# Patient Record
Sex: MALE | Race: WHITE
[De-identification: names, ages, dates, MRNs, and addresses within clinical notes are randomized per-mention and may not be internally consistent; named-entity substitution may affect disease eponyms.]

---

## 2023-07-26 ENCOUNTER — HOSPITAL ENCOUNTER (OUTPATIENT)
Dept: HOSPITAL 47 - LABPAT | Age: 63
Discharge: HOME | End: 2023-07-26
Attending: UROLOGY
Payer: MEDICARE

## 2023-07-26 DIAGNOSIS — Z01.812: Primary | ICD-10-CM

## 2023-07-26 DIAGNOSIS — C61: ICD-10-CM

## 2023-07-26 LAB
ANION GAP SERPL CALC-SCNC: 10.1 MMOL/L (ref 4–12)
BASOPHILS # BLD AUTO: 0.05 X 10*3/UL (ref 0–0.1)
BASOPHILS NFR BLD AUTO: 0.7 %
BUN SERPL-SCNC: 18.7 MG/DL (ref 9–27)
BUN/CREAT SERPL: 20.78 RATIO (ref 12–20)
CALCIUM SPEC-MCNC: 9.7 MG/DL (ref 8.7–10.3)
CHLORIDE SERPL-SCNC: 98 MMOL/L (ref 96–109)
CO2 SERPL-SCNC: 29.9 MMOL/L (ref 21.6–31.8)
EOSINOPHIL # BLD AUTO: 0.36 X 10*3/UL (ref 0.04–0.35)
EOSINOPHIL NFR BLD AUTO: 5.4 %
ERYTHROCYTE [DISTWIDTH] IN BLOOD BY AUTOMATED COUNT: 4.81 X 10*6/UL (ref 4.4–5.6)
ERYTHROCYTE [DISTWIDTH] IN BLOOD: 15.5 % (ref 11.5–14.5)
GLUCOSE SERPL-MCNC: 160 MG/DL (ref 70–110)
HCT VFR BLD AUTO: 43.1 % (ref 39.6–50)
HGB BLD-MCNC: 13.5 D/DL (ref 13–17)
IMM GRANULOCYTES BLD QL AUTO: 0.3 %
LYMPHOCYTES # SPEC AUTO: 1.26 X 10*3/UL (ref 0.9–5)
LYMPHOCYTES NFR SPEC AUTO: 18.9 %
MCH RBC QN AUTO: 28.1 PG (ref 27–32)
MCHC RBC AUTO-ENTMCNC: 31.3 D/DL (ref 32–37)
MCV RBC AUTO: 89.6 FL (ref 80–97)
MONOCYTES # BLD AUTO: 0.87 X 10*3/UL (ref 0.2–1)
MONOCYTES NFR BLD AUTO: 13 %
NEUTROPHILS # BLD AUTO: 4.12 X 10*3/UL (ref 1.8–7.7)
NEUTROPHILS NFR BLD AUTO: 61.7 %
NRBC BLD AUTO-RTO: 0 X 10*3/UL (ref 0–0.01)
PLATELET # BLD AUTO: 216 X 10*3/UL (ref 140–440)
POTASSIUM SERPL-SCNC: 4.9 MMOL/L (ref 3.5–5.5)
SODIUM SERPL-SCNC: 138 MMOL/L (ref 135–145)
WBC # BLD AUTO: 6.68 X 10*3/UL (ref 4.5–10)

## 2023-07-26 PROCEDURE — 85025 COMPLETE CBC W/AUTO DIFF WBC: CPT

## 2023-07-26 PROCEDURE — 80048 BASIC METABOLIC PNL TOTAL CA: CPT

## 2023-08-03 ENCOUNTER — HOSPITAL ENCOUNTER (OUTPATIENT)
Dept: HOSPITAL 47 - OR | Age: 63
Setting detail: OBSERVATION
LOS: 2 days | Discharge: HOME | End: 2023-08-05
Attending: UROLOGY | Admitting: UROLOGY
Payer: MEDICARE

## 2023-08-03 DIAGNOSIS — Z98.890: ICD-10-CM

## 2023-08-03 DIAGNOSIS — I10: ICD-10-CM

## 2023-08-03 DIAGNOSIS — Z79.01: ICD-10-CM

## 2023-08-03 DIAGNOSIS — Z87.19: ICD-10-CM

## 2023-08-03 DIAGNOSIS — J44.9: ICD-10-CM

## 2023-08-03 DIAGNOSIS — Z79.84: ICD-10-CM

## 2023-08-03 DIAGNOSIS — K21.9: ICD-10-CM

## 2023-08-03 DIAGNOSIS — Z82.5: ICD-10-CM

## 2023-08-03 DIAGNOSIS — I48.0: ICD-10-CM

## 2023-08-03 DIAGNOSIS — I25.10: ICD-10-CM

## 2023-08-03 DIAGNOSIS — Z79.51: ICD-10-CM

## 2023-08-03 DIAGNOSIS — Z88.5: ICD-10-CM

## 2023-08-03 DIAGNOSIS — Z90.49: ICD-10-CM

## 2023-08-03 DIAGNOSIS — M19.90: ICD-10-CM

## 2023-08-03 DIAGNOSIS — Z79.899: ICD-10-CM

## 2023-08-03 DIAGNOSIS — F17.290: ICD-10-CM

## 2023-08-03 DIAGNOSIS — Z95.5: ICD-10-CM

## 2023-08-03 DIAGNOSIS — Z79.4: ICD-10-CM

## 2023-08-03 DIAGNOSIS — Z81.8: ICD-10-CM

## 2023-08-03 DIAGNOSIS — Z95.1: ICD-10-CM

## 2023-08-03 DIAGNOSIS — C61: Primary | ICD-10-CM

## 2023-08-03 DIAGNOSIS — F32.A: ICD-10-CM

## 2023-08-03 DIAGNOSIS — Z85.828: ICD-10-CM

## 2023-08-03 DIAGNOSIS — F41.9: ICD-10-CM

## 2023-08-03 DIAGNOSIS — G47.33: ICD-10-CM

## 2023-08-03 DIAGNOSIS — E11.9: ICD-10-CM

## 2023-08-03 DIAGNOSIS — E78.5: ICD-10-CM

## 2023-08-03 DIAGNOSIS — D62: ICD-10-CM

## 2023-08-03 LAB
GLUCOSE BLD-MCNC: 137 MG/DL (ref 70–110)
GLUCOSE BLD-MCNC: 228 MG/DL (ref 70–110)
GLUCOSE BLD-MCNC: 293 MG/DL (ref 70–110)

## 2023-08-03 PROCEDURE — 64999 UNLISTED PX NERVOUS SYSTEM: CPT

## 2023-08-03 PROCEDURE — 94640 AIRWAY INHALATION TREATMENT: CPT

## 2023-08-03 PROCEDURE — 86900 BLOOD TYPING SEROLOGIC ABO: CPT

## 2023-08-03 PROCEDURE — 80048 BASIC METABOLIC PNL TOTAL CA: CPT

## 2023-08-03 PROCEDURE — 88307 TISSUE EXAM BY PATHOLOGIST: CPT

## 2023-08-03 PROCEDURE — 85025 COMPLETE CBC W/AUTO DIFF WBC: CPT

## 2023-08-03 PROCEDURE — 86850 RBC ANTIBODY SCREEN: CPT

## 2023-08-03 PROCEDURE — 55866 LAPS SURG PRST8ECT RPBIC RAD: CPT

## 2023-08-03 PROCEDURE — 86901 BLOOD TYPING SEROLOGIC RH(D): CPT

## 2023-08-03 PROCEDURE — 88309 TISSUE EXAM BY PATHOLOGIST: CPT

## 2023-08-03 PROCEDURE — 38571 LAPAROSCOPY LYMPHADENECTOMY: CPT

## 2023-08-03 PROCEDURE — 88344 IMHCHEM/IMCYTCHM EA MLT ANTB: CPT

## 2023-08-03 RX ADMIN — POTASSIUM CHLORIDE SCH: 14.9 INJECTION, SOLUTION INTRAVENOUS at 18:19

## 2023-08-03 RX ADMIN — HYDROMORPHONE HYDROCHLORIDE PRN MG: 1 INJECTION, SOLUTION INTRAMUSCULAR; INTRAVENOUS; SUBCUTANEOUS at 23:59

## 2023-08-03 RX ADMIN — INSULIN DETEMIR SCH UNIT: 100 INJECTION, SOLUTION SUBCUTANEOUS at 21:28

## 2023-08-03 RX ADMIN — ATORVASTATIN CALCIUM SCH MG: 80 TABLET, FILM COATED ORAL at 21:28

## 2023-08-03 RX ADMIN — BUDESONIDE AND FORMOTEROL FUMARATE DIHYDRATE SCH PUFF: 80; 4.5 AEROSOL RESPIRATORY (INHALATION) at 21:41

## 2023-08-03 RX ADMIN — INSULIN ASPART SCH UNIT: 100 INJECTION, SOLUTION INTRAVENOUS; SUBCUTANEOUS at 18:17

## 2023-08-03 RX ADMIN — HYDROMORPHONE HYDROCHLORIDE PRN MG: 1 INJECTION, SOLUTION INTRAMUSCULAR; INTRAVENOUS; SUBCUTANEOUS at 19:40

## 2023-08-03 RX ADMIN — POTASSIUM CHLORIDE ONE MLS: 14.9 INJECTION, SOLUTION INTRAVENOUS at 06:20

## 2023-08-03 RX ADMIN — POTASSIUM CHLORIDE SCH MLS/HR: 14.9 INJECTION, SOLUTION INTRAVENOUS at 14:43

## 2023-08-03 RX ADMIN — CLOBETASOL PROPIONATE SCH: 0.5 OINTMENT TOPICAL at 21:29

## 2023-08-03 RX ADMIN — HYDROMORPHONE HYDROCHLORIDE PRN MG: 1 INJECTION, SOLUTION INTRAMUSCULAR; INTRAVENOUS; SUBCUTANEOUS at 17:15

## 2023-08-03 RX ADMIN — POTASSIUM CHLORIDE SCH MLS: 14.9 INJECTION, SOLUTION INTRAVENOUS at 06:14

## 2023-08-03 RX ADMIN — POTASSIUM CHLORIDE SCH MLS/HR: 14.9 INJECTION, SOLUTION INTRAVENOUS at 23:58

## 2023-08-03 RX ADMIN — HYDROMORPHONE HYDROCHLORIDE PRN MG: 1 INJECTION, SOLUTION INTRAMUSCULAR; INTRAVENOUS; SUBCUTANEOUS at 14:22

## 2023-08-03 RX ADMIN — POTASSIUM CHLORIDE ONE: 14.9 INJECTION, SOLUTION INTRAVENOUS at 14:39

## 2023-08-03 NOTE — P.GSHP
History of Present Illness


H&P Date: 08/02/23


Chief Complaint: Prostate cancer


The patient is a 62-year-old white male found to have an elevated PSA level of 

14.6 in May 2023.  TERRI revealed the prostate to be firm on the left side, an 

ultrasound revealed a hypoechoic lesion at the right base.  5 of 6 prostate 

biopsies showed [place] 7 (3+4) adenocarcinoma.  Alternative treatment options 

were reviewed, the patient has elected to undergo a robotic-assisted 

laparoscopic prostatectomy with bilateral pelvic lymphadenectomy.  He has a 

history coronary artery disease and was cleared by cardiology.  He has 

previously undergone a colon resection for diverticulitis.








- Cardiovascular


Cardiovascular: Reports high blood pressure





- Genitourinary (Male)


Genitourinary: Reports urinary hesitancy





Medications and Allergies


                                    Allergies











Allergy/AdvReac Type Severity Reaction Status Date / Time


 


morphine Allergy  swelling,it Verified 08/03/23 06:13





   chiness  














Surgical - Exam





- General


well developed, well nourished, no distress





- Respiratory


normal respiratory effort





- Abdomen


Abdomen: soft, non tender, no guarding, no rigid, no rebound





- Genitourinary


normal penis with no external lesions, testicles non-tender





- Rectum


Rectum: normal sphincter tone, no masses, other (prostate firm on left)





- Psychiatric


oriented to time, oriented to person, oriented to place, speech is normal, 

memory intact





Assessment and Plan


(1) Malignant neoplasm of prostate


Current Visit: No   Status: Acute   Code(s): C61 - MALIGNANT NEOPLASM OF 

PROSTATE   SNOMED Code(s): 102034950


   


Plan: 


The patient has been made aware of the fact that his prostate cancer may require

multimodal treatment.  He has elected to undergo a robotic-assisted laparoscopic

prostatectomy with bilateral pelvic lymphadenectomy.  Due to prior abdominal 

surgery, this will be attempted but the patient is aware of the possibility that

it cannot be performed.  Potential surgical risks have been reviewed in detail. 

These include anesthesia, bleeding, infection, bowel injury, urinary leak, 

lymphocele, and vesical neck contracture.  The possibility of post-prostatectomy

urinary incontinence has been made clear, and the patient is aware of the high 

likelihood of erectile dysfunction as he is not a candidate for a nerve sparing 

procedure.  He is also aware of the possible need for adjuvant therapy.

## 2023-08-03 NOTE — P.OP
Date of Procedure: 08/03/23


Preoperative Diagnosis: 


Adenocarcinoma of the prostate


Postoperative Diagnosis: 


Same


Procedure(s) Performed: 


Robotic-assisted laparoscopic prostatectomy (RALP) with bilateral pelvic 

lymphadenectomy


Anesthesia: KRYS


Surgeon: Thomas Calhoun


Assistant #1: Bruno Teran


Estimated Blood Loss (ml): 75


IV fluids (ml): 600


Condition: stable


Disposition: PACU


Indications for Procedure: 


The patient is a 62-year-old white male found to have an elevated PSA level of 

14.6 in May 2023.  TERRI revealed the prostate to be firm on the left side, an 

ultrasound revealed a hypoechoic lesion at the right base.  5 of 6 prostate 

biopsies showed [place] 7 (3+4) adenocarcinoma.  Alternative treatment options 

were reviewed, the patient has elected to undergo a robotic-assisted 

laparoscopic prostatectomy with bilateral pelvic lymphadenectomy.  He has a 

history coronary artery disease and was cleared by cardiology.  He has 

previously undergone a colon resection for diverticulitis.


Operative Findings: 


No evidence of extraprostatic disease.


Description of Procedure: 


The patient was taken in the operating room and placed in the supine position.  

He was carefully positioned on a beanbag for stability.  The abdomen and 

external genitalia were prepped and draped sterilely.  A Jones catheter was 

inserted.  The Bovie electrocautery was used to make a midline supraumbilical 

skin incision.  The linea alba was carefully incised in the midline, and the 

peritoneum was entered.  Omentum was adherent to the anterior abdominal wall 

inferior to this, and these adhesions were gently swept away.  A gel point mini 

was then placed, and insufflation was performed to a pressure of 20 mm Hg.  Once

insufflation was performed, under camera guidance, 3 8 mm robotic ports were 

placed, 2 on the left and one on the right.  A 12 mm port was placed on the 

right lateral side for use as an assistant port.  A 5 mm port was placed to the 

right of the camera port for suction.  The patient was placed in Trendelenburg 

position, and docking was then performed to the da Octavio system utilizing a 4-

arm approach.





The abdomen was examined.  The sigmoid colon was mobilized out of the pelvis.  

Adhesions were noted deep in the pelvis, some of which were lysed enough to gain

access to the necessary surgical planes.  However, it was evident that the small

bowel would not fall out of the pelvis as desired.  The peritoneum was incised 

lateral to the medial umbilical ligaments bilaterally, exposing the pubis.  The 

peritoneum was then incised across the midline, allowing the bladder flap to be 

taken down.  The endopelvic fascia was opened bilaterally, and muscular 

attachments from the urogenital diaphragm were swept away from the prostate.





Bilateral pelvic lymphadenectomies were performed in the standard fashion.  The 

peritoneal incisions were extended in a cephalad direction, and the vas deferens

were divided bilaterally.  Margins of dissection were the bifurcation of the 

iliac vessels proximally, the circumflex iliac vein distally, the external iliac

artery laterally, and the obturator nerve medially.  A combination of sharp and 

blunt dissection was used.  Care was taken to avoid any neurovascular injury, 

and the use of monopolar electrocautery was avoided immediately adjacent to 

neurovascular structures.  Although pathology warranted an extended template, 

this was not performed given the difficulty with exposure.  No enlarged lymph 

nodes were encountered.  There were no complications.





The vesical neck was incised transversely, down to the lumen.  The Jones 

catheter was brought out through the anterior vesical neck incision and was used

for traction.  The posterior aspect of the vesical neck was incised, such that 

the full-thickness of the vesical neck was divided.  The anterior layer of the 

Denonvilliers fascia was incised, exposing the vas deferens.  Each were isolated

and divided.  Next, each of the seminal vesicles were dissected away from 

adjacent tissues, and vascular attachments were cauterized and divided.  The 

posterior leaf of Denonvilliers fascia was incised transversely, allowing entry 

into the plane between the prostate and rectum.  With lateral spreading, this 

plane was developed down to the apex.  This exposed the lateral vascular 

pedicles bilaterally.  The da Octavio vessel sealer was used to ligate and divide 

the vascular pedicles in an antegrade fashion, down to the apex.





The remaining apical attachments were swept away from the prostate.  The dorsal 

venous complex was incised, as well as periurethral tissue.  At this point, only

the urethra remained intact.  This was transected immediately distal to the 

prostatic apex using cold scissors.  The specimen was placed within a specimen 

bag.





The dorsal venous complex was sutured using a V-Loc suture in a running fashion.

 The vesicourethral anastomosis was then performed using a V-Loc suture in a 

running fashion.  After completing the anastomosis, an 18-Nepali Jones catheter 

was placed.  Due to meatal stenosis, it was necessary to use Gilchrist sounds to

dilate the urethral meatus.  Approximately 150 mL of 0.9 normal saline were 

instilled into the bladder.  No extravasation of irrigant from the 

vesicourethral anastomosis was noted.  A small amount of oozing was noted from 

the left lateral vascular pedicle, so Surgicel was placed over the vascular 

pedicles bilaterally.  A Chung-Jimenez drain was left within the pelvis, and was

brought out through the left lateral port.  Tisseel was sprayed into the pelvis 

over the vascular pedicles, dorsal vein, and vesicourethral anastomosis.





The patient was returned to the supine position.  Undocking was performed, and 

the specimen bag sutures were passed through the camera port.  After removing 

all the ports and allowing all of the CO2 to be released from the peritoneal 

cavity, the surgical specimen was removed along with the gel point.  The fascia 

of this incision was then closed using 0 PDS suture in a running fashion.  Each 

of the skin incisions were then closed using 4-0 Monocryl suture in a 

subcuticular fashion.  Marcaine was injected at each of the incision sites.  

Dermabond was applied to each incision.  The Jones catheter was connected to 

gravity drainage.  All sponge and needle counts were correct.  The patient 

tolerated the procedure well was taken to the recovery room in stable condition.

## 2023-08-03 NOTE — P.CONS
History of Present Illness





- Reason for Consult


Consult date: 08/03/23


Medical management


Requesting physician: Thomas Calhoun





- Chief Complaint


Prostate surgery





- History of Present Illness





This is a pleasant 62 patient follows with Dr. Frandy Vargas.


The patient is a 62-year-old white male found to have an elevated PSA level of 

14.6 in May 2023.  TERRI revealed the prostate to be firm on the left side, an 

ultrasound revealed a hypoechoic lesion at the right base.  5 of 6 prostate 

biopsies showed [place] 7 (3+4) adenocarcinoma.  Alternative treatment options 

were reviewed, the patient has elected to undergo a robotic-assisted 

laparoscopic prostatectomy with bilateral pelvic lymphadenectomy.  Patient today

underwent robotic-assisted laparoscopic prostatectomy and bilateral pelvic 

lymphadenectomy.  Has a Jones catheter in place.  Postprocedure has pain at the 

operative site.  No nausea vomiting.





Review of systems:


GEN.:  Tired


EYES: None


HEENT: None


NECK: None


RESPIRATORY: None


CARDIOVASCULAR: None


GASTROINTESTINAL: None


GENITOURINARY: Jones catheter


MUSCULOSKELETAL: None


LYMPHATICS: None


HEMATOLOGICAL: None  


PSYCHIATRY: None


NEUROLOGICAL: None





Past medical history to include:


Atrial fibrillation, COPD, diabetes, hypertension, osteoarthritis, obstructive 

sleep apnea uses CPAP, skin cancer, prostate cancer, CAD





Social history:


.  Patient smoked for about 30 years.  Stopped some time ago.  Alcohol 

occasionally.  Lawn cutter.





Physical examination:


VITAL SIGNS: 98.3, 78, 17, 121/68, 93% on 2 L


GENERAL: BMI 33.9, reclining in bed awake slightly uncomfortable.


EYES: Pupils equal.  Conjunctiva normal.


HEENT: External appearance of nose and ears normal, oral cavity grossly normal.


NECK: JVD not raised; masses not palpable.


HEART: First and second heart sounds are normal;  no edema.  


LUNGS: Respiratory rate normal; decreased breath sound.  


ABDOMEN: Soft,  nontender, liver spleen not palpable, no masses palpable Jones 

catheter.  


PSYCH: Alert and oriented x3;  mood  and affect likely anxiousl.  


MUSCULOSKELETAL:No Clubbing/cyanosis;muscles-grossly intact


NEUROLOGICAL: Cranial nerves grossly intact; no facial asymmetry,   power and 

sensation grossly intact. 


LYMPHATICS: No lymph nodes palpable in the axilla and neck





INVESTIGATIONS, reviewed in the clinical context:


07/26/2023:


White count 6.6 hemoglobin 13.5 platelets 216 sodium 138 potassium 4.9 

creatinine 0.9








Assessment and plan:





-Prostate adenocarcinoma with elevated PSA of 14.6 in May 2023.  506 prostate 

biopsy showed ileus and 7 adenocarcinoma.


robotic-assisted laparoscopic prostatectomy with bilateral pelvic 

lymphadenectomy.-By Dr. calhoun today.  Jones catheter in place.





-Paroxysmal atrial fibrillation


Currently eliquis on hold.  Toprol-XL 50 mg day.





-COPD in an ex-smoker


Symbicort





-Depression otherwise specified


Cymbalta





-Diabetes mellitus type 2, chronically on insulin


Resume Lantus.  Follow Accu-Cheks with sliding scale.  Hold Glucophage for now.





-GERD


PPI





-Hyperlipidemia


Crestor





-CAD





Care was discussed with the patient.  Follow Accu-Cheks.  Questions answered.  

Place on telemetry.





Thank you Dr. calhoun





Past Medical History


Past Medical History: Atrial Fibrillation, Asthma, Cancer, COPD, Diabetes 

Mellitus, Hypertension, Myocardial Infarction (MI), Osteoarthritis (OA), Sleep 

Apnea/CPAP/BIPAP


Additional Past Medical History / Comment(s): prostate cancer, skin cancer, uses

cpap


Last Myocardial Infarction Date:: 2005


History of Any Multi-Drug Resistant Organisms: None Reported


Past Surgical History: Back Surgery, Hernia Repair


Additional Past Surgical History / Comment(s): Quad bypass, hip surgerys left 

hip, anurysm repair, colon resection


Smoking Status: Former smoker





- Past Family History


  ** Father


Family Medical History: Asthma





  ** Mother


Additional Family Medical History / Comment(s): depression





Medications and Allergies


                                Home Medications











 Medication  Instructions  Recorded  Confirmed  Type


 


Apixaban [Eliquis] 5 mg PO BID 08/03/23 08/03/23 History


 


Budesonide/Formoterol Fumarate 2 puff INHALATION BID 08/03/23 08/03/23 History





[Symbicort 80-4.5 Mcg Inhaler]    


 


Clobetasol Propionate [Temovate 1 applic TOPICAL HS 08/03/23 08/03/23 History





0.05% Oint]    


 


DULoxetine HCL [Cymbalta] 60 mg PO DAILY 08/03/23 08/03/23 History


 


Furosemide [Lasix] 40 mg PO DAILY 08/03/23 08/03/23 History


 


Insulin Glargine [Lantus Vial] 58 unit SQ HS 08/03/23 08/03/23 History


 


Metoprolol Succinate (ER) [Toprol 50 mg PO DAILY 08/03/23 08/03/23 History





Xl]    


 


Multivitamin [Multivitamins Adult 1 each PO DAILY 08/03/23 08/03/23 History





Gummies]    


 


Omeprazole 20 mg PO DAILY 08/03/23 08/03/23 History


 


Rosuvastatin Calcium 40 mg PO HS 08/03/23 08/03/23 History


 


Spironolactone [Aldactone] 25 mg PO DAILY 08/03/23 08/03/23 History


 


Turmeric Root Extract [Turmeric] 1 tab PO HS 08/03/23 08/03/23 History


 


busPIRone HCL 15 mg PO BID 08/03/23 08/03/23 History


 


lisinopriL [Zestril] 10 mg PO DAILY 08/03/23 08/03/23 History


 


metFORMIN HCL [Glucophage] 850 mg PO BID 08/03/23 08/03/23 History








                                    Allergies











Allergy/AdvReac Type Severity Reaction Status Date / Time


 


morphine Allergy  swelling,it Verified 08/03/23 06:13





   chiness  














Physical Exam


Vitals: 


                                   Vital Signs











  Temp Pulse Pulse Resp BP Pulse Ox


 


 08/03/23 15:16  98.3 F  78   17  121/68  93 L


 


 08/03/23 13:46   78   16  125/59  93 L


 


 08/03/23 13:30   78   16  118/68  93 L


 


 08/03/23 13:18   72   16  112/64  93 L


 


 08/03/23 13:00   72   16  110/56  93 L


 


 08/03/23 12:45   76   16  117/59  93 L


 


 08/03/23 12:30   78   16  110/57  93 L


 


 08/03/23 12:15   69   16  115/56  95


 


 08/03/23 12:00   70   16  116/56  96


 


 08/03/23 11:46   76   16  132/64  96


 


 08/03/23 11:32   85   16  133/60  96


 


 08/03/23 11:14  97 F L  99   18  160/72  93 L


 


 08/03/23 07:20    70  18   98


 


 08/03/23 06:12  98.5 F   80  18  148/69  95








                                Intake and Output











 08/03/23 08/03/23 08/03/23





 06:59 14:59 22:59


 


Intake Total 200 700 


 


Output Total  1175 


 


Balance 200 -475 


 


Intake:   


 


   700 


 


Output:   


 


  Urine  1100 


 


  Estimated Blood Loss  75 


 


Other:   


 


  Weight 126.2 kg 126.2 kg 














Results


Labs: 


                  Abnormal Lab Results - Last 24 Hours (Table)











  08/03/23 08/03/23 Range/Units





  06:39 16:11 


 


POC Glucose (mg/dL)  137 H  228 H  ()  mg/dL

## 2023-08-04 LAB
ANION GAP SERPL CALC-SCNC: 7 MMOL/L
BASOPHILS # BLD AUTO: 0 K/UL (ref 0–0.2)
BASOPHILS NFR BLD AUTO: 0 %
BUN SERPL-SCNC: 17 MG/DL (ref 9–20)
CALCIUM SPEC-MCNC: 8.2 MG/DL (ref 8.4–10.2)
CHLORIDE SERPL-SCNC: 105 MMOL/L (ref 98–107)
CO2 SERPL-SCNC: 23 MMOL/L (ref 22–30)
EOSINOPHIL # BLD AUTO: 0 K/UL (ref 0–0.7)
EOSINOPHIL NFR BLD AUTO: 0 %
ERYTHROCYTE [DISTWIDTH] IN BLOOD BY AUTOMATED COUNT: 3.97 M/UL (ref 4.3–5.9)
ERYTHROCYTE [DISTWIDTH] IN BLOOD: 15.3 % (ref 11.5–15.5)
GLUCOSE BLD-MCNC: 197 MG/DL (ref 70–110)
GLUCOSE BLD-MCNC: 199 MG/DL (ref 70–110)
GLUCOSE BLD-MCNC: 209 MG/DL (ref 70–110)
GLUCOSE BLD-MCNC: 230 MG/DL (ref 70–110)
GLUCOSE SERPL-MCNC: 220 MG/DL (ref 74–99)
HCT VFR BLD AUTO: 36.5 % (ref 39–53)
HGB BLD-MCNC: 11.7 GM/DL (ref 13–17.5)
LYMPHOCYTES # SPEC AUTO: 0.9 K/UL (ref 1–4.8)
LYMPHOCYTES NFR SPEC AUTO: 9 %
MCH RBC QN AUTO: 29.5 PG (ref 25–35)
MCHC RBC AUTO-ENTMCNC: 32.2 G/DL (ref 31–37)
MCV RBC AUTO: 91.8 FL (ref 80–100)
MONOCYTES # BLD AUTO: 0.8 K/UL (ref 0–1)
MONOCYTES NFR BLD AUTO: 8 %
NEUTROPHILS # BLD AUTO: 8 K/UL (ref 1.3–7.7)
NEUTROPHILS NFR BLD AUTO: 80 %
PLATELET # BLD AUTO: 156 K/UL (ref 150–450)
POTASSIUM SERPL-SCNC: 4.3 MMOL/L (ref 3.5–5.1)
SODIUM SERPL-SCNC: 135 MMOL/L (ref 137–145)
WBC # BLD AUTO: 10 K/UL (ref 3.8–10.6)

## 2023-08-04 RX ADMIN — HYDROMORPHONE HYDROCHLORIDE PRN MG: 1 INJECTION, SOLUTION INTRAMUSCULAR; INTRAVENOUS; SUBCUTANEOUS at 05:54

## 2023-08-04 RX ADMIN — FUROSEMIDE SCH MG: 40 TABLET ORAL at 09:08

## 2023-08-04 RX ADMIN — HYDROCODONE BITARTRATE AND ACETAMINOPHEN PRN EACH: 5; 325 TABLET ORAL at 20:31

## 2023-08-04 RX ADMIN — CLOBETASOL PROPIONATE SCH APPLIC: 0.5 OINTMENT TOPICAL at 20:31

## 2023-08-04 RX ADMIN — HYDROCODONE BITARTRATE AND ACETAMINOPHEN PRN EACH: 5; 325 TABLET ORAL at 12:01

## 2023-08-04 RX ADMIN — ATORVASTATIN CALCIUM SCH MG: 80 TABLET, FILM COATED ORAL at 20:31

## 2023-08-04 RX ADMIN — INSULIN DETEMIR SCH UNIT: 100 INJECTION, SOLUTION SUBCUTANEOUS at 20:34

## 2023-08-04 RX ADMIN — SPIRONOLACTONE SCH MG: 25 TABLET, FILM COATED ORAL at 09:08

## 2023-08-04 RX ADMIN — THERA TABS SCH EACH: TAB at 09:08

## 2023-08-04 RX ADMIN — DULOXETINE SCH MG: 60 CAPSULE, DELAYED RELEASE ORAL at 09:08

## 2023-08-04 RX ADMIN — METOPROLOL SUCCINATE SCH MG: 50 TABLET, EXTENDED RELEASE ORAL at 09:08

## 2023-08-04 RX ADMIN — HYDROMORPHONE HYDROCHLORIDE PRN MG: 1 INJECTION, SOLUTION INTRAMUSCULAR; INTRAVENOUS; SUBCUTANEOUS at 09:08

## 2023-08-04 RX ADMIN — PANTOPRAZOLE SODIUM SCH MG: 40 TABLET, DELAYED RELEASE ORAL at 07:09

## 2023-08-04 RX ADMIN — HYDROCODONE BITARTRATE AND ACETAMINOPHEN PRN EACH: 5; 325 TABLET ORAL at 16:25

## 2023-08-04 RX ADMIN — BUDESONIDE AND FORMOTEROL FUMARATE DIHYDRATE SCH PUFF: 80; 4.5 AEROSOL RESPIRATORY (INHALATION) at 22:21

## 2023-08-04 RX ADMIN — POTASSIUM CHLORIDE SCH: 14.9 INJECTION, SOLUTION INTRAVENOUS at 12:03

## 2023-08-04 RX ADMIN — INSULIN ASPART SCH UNIT: 100 INJECTION, SOLUTION INTRAVENOUS; SUBCUTANEOUS at 17:48

## 2023-08-04 RX ADMIN — INSULIN ASPART SCH UNIT: 100 INJECTION, SOLUTION INTRAVENOUS; SUBCUTANEOUS at 07:09

## 2023-08-04 RX ADMIN — POTASSIUM CHLORIDE SCH: 14.9 INJECTION, SOLUTION INTRAVENOUS at 20:05

## 2023-08-04 RX ADMIN — INSULIN ASPART SCH UNIT: 100 INJECTION, SOLUTION INTRAVENOUS; SUBCUTANEOUS at 12:01

## 2023-08-04 RX ADMIN — BUDESONIDE AND FORMOTEROL FUMARATE DIHYDRATE SCH PUFF: 80; 4.5 AEROSOL RESPIRATORY (INHALATION) at 07:38

## 2023-08-04 RX ADMIN — POTASSIUM CHLORIDE SCH: 14.9 INJECTION, SOLUTION INTRAVENOUS at 08:18

## 2023-08-04 NOTE — P.PN
Progress Note - Text


Progress Note Date: 08/04/23





- Chief Complaint


Prostate surgery





- History of Present Illness





This is a pleasant 62 patient follows with Dr. Frandy Vargas.


The patient is a 62-year-old white male found to have an elevated PSA level of 

14.6 in May 2023.  TERRI revealed the prostate to be firm on the left side, an 

ultrasound revealed a hypoechoic lesion at the right base.  5 of 6 prostate 

biopsies showed [place] 7 (3+4) adenocarcinoma.  Alternative treatment options 

were reviewed, the patient has elected to undergo a robotic-assisted 

laparoscopic prostatectomy with bilateral pelvic lymphadenectomy.  Patient today

underwent robotic-assisted laparoscopic prostatectomy and bilateral pelvic 

lymphadenectomy.  Has a Jones catheter in place.  Postprocedure has pain at the 

operative site.  No nausea vomiting.


August 4: Sitting at the edge of the bed.  Wife at the bedside.  Did eat a 

little bit.  Jones catheter in place.  CYNDI drain-about 130 mL of serosanguineous 

discharge.  Pain at the operative site.  Did walk a bit.





Active Medications





Acetaminophen (Acetaminophen Tab 325 Mg Tab)  650 mg PO Q4HR PRN


   PRN Reason: Fever


   Stop: 09/02/23 11:01


Hydrocodone Bitart/Acetaminophen (Hydrocodone/Apap 5-325mg 1 Each Tab)  2 each 

PO Q4HR PRN


   PRN Reason: Moderate to Severe Pain (4-10)


   Last Admin: 08/04/23 16:25 Dose:  2 each


   


Hydrocodone Bitart/Acetaminophen (Hydrocodone/Apap 5-325mg 1 Each Tab)  1 each 

PO Q4HR PRN


   PRN Reason: Mild Pain (1 - 3)


Atorvastatin Calcium (Atorvastatin 80 Mg Tab)  80 mg PO [facility]


   Stop: 09/02/23 21:01


   Last Admin: 08/03/23 21:28 Dose:  80 mg


   


Budesonide/Formoterol Fumarate (Symbicort 80-4.5 Mcg Inhaler)  2 puff INHALATION

BID [facility]


   Stop: 09/02/23 21:01


   Last Admin: 08/04/23 07:38 Dose:  2 puff


   


Buspirone HCl (Buspirone Hcl 5 Mg Tab)  15 mg PO BID [facility]


   Stop: 09/02/23 21:01


   Last Admin: 08/04/23 09:08 Dose:  15 mg


   


Clobetasol Propionate (Clobetasol Prop 0.05% Oint 15gm)  1 applic TOPICAL [facility]; Protocol


   Last Admin: 08/03/23 21:29 Dose:  Not Given


   


Dextrose/Water (Dextrose 50% Syringe 50 Ml)  25 ml IVP PER PROTOCOL PRN; 

Protocol


   PRN Reason: Hypoglycemia


Dextrose/Water (Dextrose 50% Syringe 50 Ml)  50 ml IVP PER PROTOCOL PRN; 

Protocol


   PRN Reason: Hypoglycemia


Duloxetine HCl (Duloxetine Hcl 60 Mg Capsule.Dr)  60 mg PO DAILY [facility]


   Stop: 09/03/23 09:01


   Last Admin: 08/04/23 09:08 Dose:  60 mg


   


Furosemide (Furosemide 40 Mg Tab)  40 mg PO DAILY [facility]


   Stop: 09/03/23 09:01


   Last Admin: 08/04/23 09:08 Dose:  40 mg


   


Hydromorphone HCl (Hydromorphone 1 Mg/Ml 1 Ml Syringe)  1 mg IVP Q2HR PRN


   PRN Reason: Severe Pain (Scale 7 to 10)


   Stop: 09/02/23 11:01


   Last Admin: 08/04/23 09:08 Dose:  1 mg


   


Lactated Ringer's (Lactated Ringers)  1,000 mls @ 20 mls/hr IV .Q24H [facility]


   Stop: 09/02/23 06:06


   Last Admin: 08/04/23 08:18 Dose:  Not Given


   


Dextrose/Sodium Chloride (Dextrose 5%-1/2ns Iv Soln)  1,000 mls @ 125 mls/hr IV 

.Q8H [facility]


   Stop: 09/02/23 11:01


   Last Admin: 08/04/23 12:03 Dose:  Not Given


   


Insulin Aspart (Insulin Aspart (Novolog) 100 Unit/Ml Vial)  0 unit SQ AC-TID 

[facility]; Protocol


   Last Admin: 08/04/23 17:48 Dose:  3 unit


   


Insulin Detemir (Insulin Detemir (Levemir) 100 Unit/Ml Syr)  46 unit SQ [facility]


   Last Admin: 08/03/23 21:28 Dose:  46 unit


   


Ketorolac Tromethamine (Ketorolac 15 Mg/Ml 1 Ml Vial)  15 mg IVP Q6HR PRN


   PRN Reason: Mild to Moderate Pain (1 - 6)


   Stop: 08/06/23 11:03


Lisinopril (Lisinopril 10 Mg Tab)  10 mg PO DAILY [facility]


   Stop: 09/03/23 09:01


   Last Admin: 08/04/23 09:08 Dose:  10 mg


   


Metoprolol Succinate (Metoprolol Succinate (Er) 50 Mg Tab.Er.24h)  50 mg PO 

DAILY [facility]


   Stop: 09/03/23 09:01


   Last Admin: 08/04/23 09:08 Dose:  50 mg


   


Multivitamins (Multivitamins, Thera 1 Each Tab)  1 each PO DAILY [facility]


   Last Admin: 08/04/23 09:08 Dose:  1 each


   


Ondansetron HCl (Ondansetron 4 Mg/2 Ml Vial)  4 mg IVP Q8HR PRN


   PRN Reason: Nausea


   Stop: 09/02/23 11:01


Pantoprazole Sodium (Pantoprazole 40 Mg Tablet)  40 mg PO AC-BRKFST [facility]


   Last Admin: 08/04/23 07:09 Dose:  40 mg


   


Spironolactone (Spironolactone 25 Mg Tab)  25 mg PO DAILY [facility]


   Stop: 09/03/23 09:01


   Last Admin: 08/04/23 09:08 Dose:  25 mg


   

















Past medical history to include:


Atrial fibrillation, COPD, diabetes, hypertension, osteoarthritis, obstructive 

sleep apnea uses CPAP, skin cancer, prostate cancer, CAD





Social history:


.  Patient smoked for about 30 years.  Stopped some time ago.  Alcohol 

occasionally.  Lawn cutter.





Physical examination:


VITAL SIGNS: 98.1, 70, 18, 123/60, 92% room air


GENERAL: Sitting at the edge of the bed


EYES: Pupils equal.  Conjunctiva normal.


HEENT: External appearance of nose and ears normal, oral cavity grossly normal.


NECK: JVD not raised; masses not palpable.


HEART: First and second heart sounds are normal;  no edema.  


LUNGS: Respiratory rate normal; decreased breath sound.  


ABDOMEN: Soft,  nontender, liver spleen not palpable, no masses palpable Jones 

catheter.  CYNDI drain. 


PSYCH: Alert and oriented x3;  mood  and affect likely anxiousl.  


MUSCULOSKELETAL:No Clubbing/cyanosis;muscles-grossly intact








INVESTIGATIONS, reviewed in the clinical context:


August 4: White count and hemoglobin 9.7 platelets 156 potassium 4.3 creatinine 

0.75


07/26/2023:


White count 6.6 hemoglobin 13.5 platelets 216 sodium 138 potassium 4.9 

creatinine 0.9








Assessment and plan:





-Prostate adenocarcinoma with elevated PSA of 14.6 in May 2023.  506 prostate 

biopsy showed ileus and 7 adenocarcinoma.


robotic-assisted laparoscopic prostatectomy with bilateral pelvic 

lymphadenectomy.-By Dr. berger today.  Jones catheter in place.  CYNDI drain.





-Paroxysmal atrial fibrillation


Eliquis to resume when okay with Dr. berger.  Toprol-XL 50 mg day.





-COPD in an ex-smoker


Symbicort





-Acute postprocedure blood loss anemia expected from surgery


Follow H&H





-Depression otherwise specified


Cymbalta





-Diabetes mellitus type 2, chronically on insulin


Lantus.  Follow Accu-Cheks with sliding scale.  Hold Glucophage for now.





-GERD


PPI





-Hyperlipidemia


Crestor





-CAD





Discussed with patient and wife.  Increase activity as tolerate.  Continue 

current medications.





Thank you Dr. berger

## 2023-08-05 VITALS
HEART RATE: 77 BPM | DIASTOLIC BLOOD PRESSURE: 66 MMHG | TEMPERATURE: 98 F | RESPIRATION RATE: 18 BRPM | SYSTOLIC BLOOD PRESSURE: 120 MMHG

## 2023-08-05 LAB — GLUCOSE BLD-MCNC: 140 MG/DL (ref 70–110)

## 2023-08-05 RX ADMIN — SPIRONOLACTONE SCH MG: 25 TABLET, FILM COATED ORAL at 08:40

## 2023-08-05 RX ADMIN — FUROSEMIDE SCH MG: 40 TABLET ORAL at 08:40

## 2023-08-05 RX ADMIN — INSULIN ASPART SCH: 100 INJECTION, SOLUTION INTRAVENOUS; SUBCUTANEOUS at 05:47

## 2023-08-05 RX ADMIN — BUDESONIDE AND FORMOTEROL FUMARATE DIHYDRATE SCH PUFF: 80; 4.5 AEROSOL RESPIRATORY (INHALATION) at 08:45

## 2023-08-05 RX ADMIN — HYDROCODONE BITARTRATE AND ACETAMINOPHEN PRN EACH: 5; 325 TABLET ORAL at 10:14

## 2023-08-05 RX ADMIN — THERA TABS SCH EACH: TAB at 08:40

## 2023-08-05 RX ADMIN — POTASSIUM CHLORIDE SCH: 14.9 INJECTION, SOLUTION INTRAVENOUS at 05:47

## 2023-08-05 RX ADMIN — DULOXETINE SCH MG: 60 CAPSULE, DELAYED RELEASE ORAL at 08:39

## 2023-08-05 RX ADMIN — PANTOPRAZOLE SODIUM SCH MG: 40 TABLET, DELAYED RELEASE ORAL at 06:35

## 2023-08-05 RX ADMIN — HYDROCODONE BITARTRATE AND ACETAMINOPHEN PRN EACH: 5; 325 TABLET ORAL at 06:37

## 2023-08-05 RX ADMIN — HYDROCODONE BITARTRATE AND ACETAMINOPHEN PRN EACH: 5; 325 TABLET ORAL at 02:45

## 2023-08-05 RX ADMIN — METOPROLOL SUCCINATE SCH MG: 50 TABLET, EXTENDED RELEASE ORAL at 08:40

## 2023-08-05 RX ADMIN — POTASSIUM CHLORIDE SCH: 14.9 INJECTION, SOLUTION INTRAVENOUS at 03:47

## 2023-08-05 NOTE — P.DS
Providers


Date of admission: 


08/04/23 07:51





Expected date of discharge: 08/05/23


Attending physician: 


Thomas Calhoun





Consults: 





                                        





08/03/23 12:00


Consult Physician Routine 


   Consulting Provider: Uri Stanley


   Consult Reason/Comments: Medical Management


   Do you want consulting provider notified?: Yes











Primary care physician: 


Frandy English








- Discharge Diagnosis(es)


(1) Malignant neoplasm of prostate


Current Visit: No   Status: Acute   


Hospital Course: 


On the day of admission, the patient underwent an RALP.  The perioperative 

course was unremarkable.  The patient remained afebrile with stable vital signs.

 On the first postoperative day, he reported incisional discomfort but otherwise

felt well.  He was feeling better at the time of discharge.  He was tolerating 

diet and denied nausea.  On examination, the incisions were clean, dry, and 

intact.  Jones catheter was draining clear yellow urine.  The CYNDI drain was 

draining  mL of serosanguineous fluid per shift.  


Procedures: 


Robotic-assisted laparoscopic prostatectomy (RALP) with bilateral pelvic lymph 

node dissection on 08/03/2023.





Patient Condition at Discharge: Good





Plan - Discharge Summary


New Discharge Prescriptions: 


New


   Cephalexin [Keflex] 500 mg PO Q8HR 1 Days #9 cap


   HYDROcodone/APAP 5-325MG [Norco 5-325] 1 - 2 tab PO Q4HR PRN #6 tab


     PRN Reason: Pain


   Ketorolac [Toradol] 10 mg PO Q6HR PRN #12 tab


     PRN Reason: Pain





No Action


   Metoprolol Succinate (ER) [Toprol Xl] 50 mg PO DAILY


   Turmeric Root Extract [Turmeric] 1 tab PO HS


   Multivitamin [Multivitamins Adult Gummies] 1 each PO DAILY


   Apixaban [Eliquis] 5 mg PO BID


   lisinopriL [Zestril] 10 mg PO DAILY


   Furosemide [Lasix] 40 mg PO DAILY


   DULoxetine HCL [Cymbalta] 60 mg PO DAILY


   Insulin Glargine [Lantus Vial] 58 unit SQ HS


   Spironolactone [Aldactone] 25 mg PO DAILY


   busPIRone HCL 15 mg PO BID


   Omeprazole 20 mg PO DAILY


   Clobetasol Propionate [Temovate 0.05% Oint] 1 applic TOPICAL HS


   Rosuvastatin Calcium 40 mg PO HS


   metFORMIN HCL [Glucophage] 850 mg PO BID


   Budesonide/Formoterol Fumarate [Symbicort 80-4.5 Mcg Inhaler] 2 puff 

INHALATION BID


Discharge Medication List





Apixaban [Eliquis] 5 mg PO BID 08/03/23 [History]


Budesonide/Formoterol Fumarate [Symbicort 80-4.5 Mcg Inhaler] 2 puff INHALATION 

BID 08/03/23 [History]


Clobetasol Propionate [Temovate 0.05% Oint] 1 applic TOPICAL HS 08/03/23 

[History]


DULoxetine HCL [Cymbalta] 60 mg PO DAILY 08/03/23 [History]


Furosemide [Lasix] 40 mg PO DAILY 08/03/23 [History]


Insulin Glargine [Lantus Vial] 58 unit SQ HS 08/03/23 [History]


Metoprolol Succinate (ER) [Toprol Xl] 50 mg PO DAILY 08/03/23 [History]


Multivitamin [Multivitamins Adult Gummies] 1 each PO DAILY 08/03/23 [History]


Omeprazole 20 mg PO DAILY 08/03/23 [History]


Rosuvastatin Calcium 40 mg PO HS 08/03/23 [History]


Spironolactone [Aldactone] 25 mg PO DAILY 08/03/23 [History]


Turmeric Root Extract [Turmeric] 1 tab PO HS 08/03/23 [History]


busPIRone HCL 15 mg PO BID 08/03/23 [History]


lisinopriL [Zestril] 10 mg PO DAILY 08/03/23 [History]


metFORMIN HCL [Glucophage] 850 mg PO BID 08/03/23 [History]


Cephalexin [Keflex] 500 mg PO Q8HR 1 Days #9 cap 08/05/23 [Rx]


HYDROcodone/APAP 5-325MG [Norco 5-325] 1 - 2 tab PO Q4HR PRN #6 tab 08/05/23 

[Rx]


Ketorolac [Toradol] 10 mg PO Q6HR PRN #12 tab 08/05/23 [Rx]








Follow up Appointment(s)/Referral(s): 


Thomas Calhoun MD [STAFF PHYSICIAN] - 3 Days


Activity/Diet/Wound Care/Special Instructions: 


Discharge home with Jones catheter.  Instruct patient to use overnight drainage 

bag as well as urinary leg bag.  Please teach patient to measure and record CYNDI 

output.  





Diet as tolerated.  No lifting, driving, or strenuous activity.  Reassure 

patient that abdominal wall ecchymosis and penoscrotal swelling are normal.  





Patient to call Dr. Calhoun on 08/07/2023 Re: CYNDI output.


Discharge Disposition: HOME SELF-CARE
no dyspnea/no hemoptysis/no pleuritic chest pain

## 2023-08-06 NOTE — P.ANPRN
Procedure Note - Anesthesia





- Nerve Block Performed


  ** Bilateral Erector Spinae Single


Time Out Performed: Yes


Date of Procedure: 08/03/23


Procedure Start Time: 07:07


Procedure Stop Time: 07:15


Location of Patient: PreOp


Indication: Acute Post-Operative Pain, Requested by Surgeon


Sedation Type: Sedate with meaningful contact maintained


Preparation: Sterile Prep


Position: Prone


Needle Types: [place]


Needle Gauge: 21


Ultrasound used to visualize needle placement: Yes


Ultrasound used to observe medication spread: Yes


Blood Aspirated: No


Pain Paresthesia on Injection Noted: No


Resistance on Injection: Normal


Image Stored and Saved: Yes


Events: Uneventful and Well Tolerated (Ropivacaine 0.5% 15 mL plus normal saline

10 mL plus dexamethasone 4 mg given bilaterally at L1)

## 2025-01-30 ENCOUNTER — HOSPITAL ENCOUNTER (OUTPATIENT)
Dept: HOSPITAL 47 - 3 N SLEEP | Age: 65
End: 2025-01-30
Payer: MEDICARE

## 2025-01-30 VITALS
TEMPERATURE: 98.1 F | RESPIRATION RATE: 18 BRPM | DIASTOLIC BLOOD PRESSURE: 84 MMHG | SYSTOLIC BLOOD PRESSURE: 161 MMHG | HEART RATE: 77 BPM

## 2025-01-30 DIAGNOSIS — E66.9: ICD-10-CM

## 2025-01-30 DIAGNOSIS — Z88.5: ICD-10-CM

## 2025-01-30 DIAGNOSIS — Z98.890: ICD-10-CM

## 2025-01-30 DIAGNOSIS — G47.33: Primary | ICD-10-CM

## 2025-01-30 DIAGNOSIS — I10: ICD-10-CM

## 2025-01-30 DIAGNOSIS — E78.5: ICD-10-CM

## 2025-01-30 DIAGNOSIS — Z99.89: ICD-10-CM

## 2025-01-30 DIAGNOSIS — J45.909: ICD-10-CM

## 2025-01-30 PROCEDURE — 99211 OFF/OP EST MAY X REQ PHY/QHP: CPT

## 2025-01-30 NOTE — P.SLEEP
History of Present Illness


DATE: 1/30/2025





CONSULTATION/NEW PATIENT EVALUATION





HISTORY OF PRESENT ILLNESS/SLEEP-WAKE EVALUATION:   64-year-old gentleman had b

een evaluated in the sleep center for obstructive sleep apnea hypopnea syndrome.

 Patient has history of obstructive sleep apnea for more than 10 years.  He 

continued to use his CPAP equipment every night but still wake up from sleep 

several times during the night.  I checked CPAP unit CPAP pressure is 14 cm of 

water.  Usage is 100% of nights, average 9.3 hours per night.  Leak is high 61 

L/min.  Patient is using nasal pillow mask.  Feels dryness in his mouth.  Apnea 

hypopnea index is 0.5 which is normal.





SLEEP SCHEDULE: Usually sleep schedule from 1011 PM to 89 AM 7 days a week.





FALLING ASLEEP: Sometimes patient has difficulties with falling asleep, has TV 

set in bedroom.





DURING SLEEP: Patient snores and wakes up from sleep up to 3 times.  No history 

of hypnogogical hallucinations, sleep paralysis, or cataplexy.





DURING THE DAY/WAKE STATE:  [].  [place] sleepiness scale is 3, which is normal.

  Patient may take up to 2 naps during the day.





PAST MEDICAL HISTORY:   Hypertension, diabetes, acid reflux, hyperlipidemia, 

asthma, bronchitis.





PAST SURGICAL HISTORY: 2 back surgeries, surgery for aortic aneurysm, prostate 

surgery.





MEDICATIONS:   Please see below.





SOCIAL HISTORY:   Please see below.





FAMILY HISTORY: Please see below.





REVIEW OF SYSTEMS: Awakenings from sleep. No fevers. No double vision. No recent

 chest pain. No shortness of breath. No abdominal pain. No bleeding episodes. No

 blood in urine. No seizure episodes. 





PHYSICAL EXAMINATION: 


GENERAL: A pleasant patient without any distress. 


VITAL SIGNS: Please see below, weight 285 pounds, BMI 38.1.


HEENT: PERRLA, EOMI. Evaluation of oropharynx showed tongue protrudes midline, 

low position of soft palate Mallampati  3.


NECK: Supple. No JVD. Thyroid is not palpable.   20 inches in circumference.


LUNGS: Clear to percussion and to auscultation. Good air exchange. No wheezing 

or rhonchi. 


HEART: S1, S2 regular. No murmurs, gallops or rubs. 


ABDOMEN: Soft and nontender. Bowel sounds are present. No organomegaly 

appreciated. 


EXTREMITIES: No clubbing or cyanosis. 


CNS: Awake, alert, and oriented x3. Cranial nerves 2 to 7 intact. There is no 

fasciculation or atrophy noted. No focal deficits observed. 





ASSESSMENT:


1.  Obstructive sleep apnea hypopnea syndrome for 10 years.  Low position of 

soft palate Mallampati 3, wide neck 20 inches in circumference.  Patient 

demonstrated 100% usage of CPAP unit.  Normal apnea-hypopnea index, but patient 

still wakes up from sleep while using CPAP.


2.  Obesity, BMI 38.1.


3.  Hypertension.


4.  Hyperlipidemia.


5   asthma.


6 .  Status post several back surgeries.


7.  Status post several hip surgeries.


8.  Status post prostate surgery.








I adjusted CPAP unit to AutoPap with range of the pressure 10 to 15 cm of water.





PLAN: 


1.   Patient will continue to use CPAP equipment every night for the whole 

night. 


2.   Prescription for all necessary CPAP supplies. 


3.   Preferable position during sleep on the side. 


4.   No driving if patient feels any sleepiness. Patient is aware of civil and 

criminal liability for unsafe driving. 


5.             Sleep hygiene with regular sleep time for at least 7.5-8 hours.


6.   Watching and losing weight. 


7.   Follow-up visit in 2-3 months.





Thank you very much for referring this patient for consultation.





Sincerely,











Chance Lara MD, PhD, FAASM.


Diplomat of American Board of Sleep Medicine,


Sleep Medicine Board by American Board of Medical Specialities


American Board of Internal Medicine


Medical Director of [facility]











cc: Frandy Vargas MD





Past Medical History


Past Medical History: Atrial Fibrillation, Asthma, Cancer, COPD, Diabetes 

Mellitus, Hypertension, Myocardial Infarction (MI), Osteoarthritis (OA), Sleep 

Apnea/CPAP/BIPAP


Additional Past Medical History / Comment(s): prostate cancer, skin cancer, uses

 cpap, Depression


Last Myocardial Infarction Date:: 2005


History of Any Multi-Drug Resistant Organisms: None Reported


Past Surgical History: Back Surgery, Hernia Repair


Additional Past Surgical History / Comment(s): Quad bypass, 5 hip surgerys left 

hip, anurysm repair, colon resection, prostate surgery, skin cancer - r forearm


Past Psychological History: Anxiety, Depression


Additional Psychological History / Comment(s): patient lost son March 2023, 

lives home with wife and dog


Smoking Status: Former smoker


Past Alcohol Use History: Daily


Past Drug Use History: Marijuana





- Past Family History


  ** Father


Family Medical History: Asthma, Cancer, CVA/TIA, Hyperlipidemia


Additional Family Medical History / Comment(s): Arthritis,





  ** Mother


Family Medical History: Hyperlipidemia


Additional Family Medical History / Comment(s): depression, arthritis,





Medications and Allergies


                                Home Medications











 Medication  Instructions  Recorded  Confirmed  Type


 


Apixaban [Eliquis] 5 mg PO BID 08/03/23 01/30/25 History


 


Budesonide/Formoterol Fumarate 4 puff INHALATION BID 08/03/23 01/30/25 History





[Symbicort 80-4.5 Mcg Inhaler]    


 


Clobetasol Propionate [Temovate 1 applic TOPICAL HS 08/03/23 08/03/23 History





0.05% Oint]    


 


DULoxetine HCL [Cymbalta] 90 mg PO DAILY 08/03/23 01/30/25 History


 


Furosemide [Lasix] 40 mg PO DAILY 08/03/23 08/03/23 History


 


Insulin Glargine (Lantus) [Lantus 58 unit SQ HS 08/03/23 01/30/25 History





Vial]    


 


Metoprolol Succinate (ER) [Toprol 50 mg PO DAILY 08/03/23 01/30/25 History





Xl]    


 


Multivitamin [Multivitamins Adult 1 each PO DAILY 08/03/23 01/30/25 History





Gummies]    


 


Omeprazole 20 mg PO DAILY 08/03/23 01/30/25 History


 


Rosuvastatin Calcium 40 mg PO HS 08/03/23 01/30/25 History


 


Spironolactone [Aldactone] 25 mg PO DAILY 08/03/23 01/30/25 History


 


Turmeric Root Extract [Turmeric] 1,000 mg PO HS 08/03/23 01/30/25 History


 


busPIRone HCL 15 mg PO DAILY 08/03/23 01/30/25 History


 


lisinopriL [Zestril] 10 mg PO DAILY 08/03/23 01/30/25 History


 


metFORMIN HCL [Glucophage] 850 mg PO BID 08/03/23 08/03/23 History


 


Cephalexin [Keflex] 500 mg PO Q8HR 1 Days #9 cap 08/05/23  Rx


 


Ketorolac [Toradol] 10 mg PO Q6HR PRN #12 tab 08/05/23  Rx


 


Empagliflozin [Jardiance] 10 mg PO DAILY 01/30/25 01/30/25 History


 


HYDROcodone/APAP 5-325MG [Norco 500 mg PO AS DIRECTED PRN 01/30/25 01/30/25 

History





5-325]    


 


Terbinafine [LamISIL] 250 mg PO DAILY 01/30/25 01/30/25 History








                                    Allergies











Allergy/AdvReac Type Severity Reaction Status Date / Time


 


morphine Allergy  swelling,it Verified 08/03/23 06:13





   chiness  














Physical Exam


Vitals: 


                                   Vital Signs











  Temp Pulse Resp BP Pulse Ox


 


 01/30/25 15:07  98.1 F  77  18  161/84  96








                                Intake and Output











 01/30/25 01/30/25 01/30/25





 06:59 14:59 22:59


 


Other:   


 


  Weight   127.459 kg














Sleep Note





- Sleep Data


ESS Total: 3





- Sleep Note


Sleep Note: 


Temperature: 98.1 F


Pulse Rate: 77


Respiratory Rate: 18


Blood Pressure: 161/84


SpO2: 96


Height: 6 ft


Weight: 127.459 kg


BMI: 


Neck Circumference: 20